# Patient Record
Sex: MALE | Employment: FULL TIME | URBAN - METROPOLITAN AREA
[De-identification: names, ages, dates, MRNs, and addresses within clinical notes are randomized per-mention and may not be internally consistent; named-entity substitution may affect disease eponyms.]

---

## 2024-06-02 ENCOUNTER — HOSPITAL ENCOUNTER (EMERGENCY)
Facility: HOSPITAL | Age: 29
Discharge: HOME | End: 2024-06-02
Attending: EMERGENCY MEDICINE
Payer: COMMERCIAL

## 2024-06-02 VITALS
HEART RATE: 77 BPM | OXYGEN SATURATION: 95 % | BODY MASS INDEX: 25.1 KG/M2 | RESPIRATION RATE: 16 BRPM | SYSTOLIC BLOOD PRESSURE: 105 MMHG | TEMPERATURE: 98.8 F | WEIGHT: 169.97 LBS | DIASTOLIC BLOOD PRESSURE: 69 MMHG

## 2024-06-02 DIAGNOSIS — F11.10 HEROIN ABUSE (HCC): Primary | ICD-10-CM

## 2024-06-02 PROCEDURE — 99283 EMERGENCY DEPT VISIT LOW MDM: CPT

## 2024-06-02 PROCEDURE — 99284 EMERGENCY DEPT VISIT MOD MDM: CPT | Performed by: EMERGENCY MEDICINE

## 2024-06-02 NOTE — ED PROVIDER NOTES
History  Chief Complaint   Patient presents with    Drug Problem     Patient says he was at [facility], has been using suboxone, but had trouble refilling it and he used Fentanyl this morning     28-year-old male who arrives via EMS for concerns of intoxication.  Patient endorses using fentanyl or heroin earlier in the day.  States he ran out of his Suboxone, relapsed that she was feeling withdrawal.  Patient has questions on how to restart at home.  Advised to delay at least 36 hours hopefully 48 hours between uses of heroin and fentanyl before restarting.  Patient verbalized understanding of plan.      Drug Problem  Associated symptoms: no abdominal pain, no chest pain, no cough, no diarrhea, no fever, no nausea, no rash, no rhinorrhea, no shortness of breath, no sore throat, no vomiting and no wheezing        Prior to Admission Medications   Prescriptions Last Dose Informant Patient Reported? Taking?   Narcan 4 MG/0.1ML nasal spray   Yes No   QUEtiapine (SEROquel) 100 mg tablet   Yes No   Sig: Take 100 mg by mouth   acetaminophen (TYLENOL) 325 mg tablet   Yes No   albuterol (PROVENTIL HFA,VENTOLIN HFA) 90 mcg/act inhaler   Yes No   Sig: Inhale 1 puff 4 (four) times a day   buprenorphine (SUBUTEX) 8 mg   Yes No   buprenorphine-naloxone (Suboxone) 8-2 mg   No No   Sig: Place 1 Film (8 mg total) under the tongue 2 (two) times a day   ibuprofen (MOTRIN) 800 mg tablet   Yes No   mirtazapine (REMERON) 15 mg tablet   Yes No      Facility-Administered Medications: None       Past Medical History:   Diagnosis Date    Asthma     Drug use        History reviewed. No pertinent surgical history.    History reviewed. No pertinent family history.  I have reviewed and agree with the history as documented.    E-Cigarette/Vaping    E-Cigarette Use Never User      E-Cigarette/Vaping Substances     Social History     Tobacco Use    Smoking status: Every Day     Current packs/day: 1.00     Types: Cigarettes    Smokeless tobacco: Never    Vaping Use    Vaping status: Never Used   Substance Use Topics    Alcohol use: Never    Drug use: Not Currently     Types: Marijuana, Heroin, Methamphetamines     Comment: K2       Review of Systems   Constitutional: Negative.  Negative for chills and fever.   HENT: Negative.  Negative for rhinorrhea, sore throat, trouble swallowing and voice change.    Eyes: Negative.  Negative for pain and visual disturbance.   Respiratory: Negative.  Negative for cough, shortness of breath and wheezing.    Cardiovascular: Negative.  Negative for chest pain and palpitations.   Gastrointestinal:  Negative for abdominal pain, diarrhea, nausea and vomiting.   Genitourinary: Negative.  Negative for dysuria and frequency.   Musculoskeletal: Negative.  Negative for neck pain and neck stiffness.   Skin: Negative.  Negative for rash.   Neurological: Negative.  Negative for dizziness, speech difficulty, weakness, light-headedness and numbness.       Physical Exam  Physical Exam  Vitals and nursing note reviewed.   Constitutional:       General: He is not in acute distress.     Appearance: He is well-developed.   HENT:      Head: Normocephalic and atraumatic.   Eyes:      Conjunctiva/sclera: Conjunctivae normal.      Pupils: Pupils are equal, round, and reactive to light.   Neck:      Trachea: No tracheal deviation.   Cardiovascular:      Rate and Rhythm: Normal rate and regular rhythm.   Pulmonary:      Effort: Pulmonary effort is normal. No respiratory distress.      Breath sounds: Normal breath sounds. No wheezing or rales.   Abdominal:      General: Bowel sounds are normal. There is no distension.      Palpations: Abdomen is soft.      Tenderness: There is no abdominal tenderness. There is no guarding or rebound.   Musculoskeletal:         General: No tenderness or deformity. Normal range of motion.      Cervical back: Normal range of motion and neck supple.   Skin:     General: Skin is warm and dry.      Capillary Refill: Capillary  refill takes less than 2 seconds.      Findings: No rash.   Neurological:      Mental Status: He is alert and oriented to person, place, and time.   Psychiatric:         Behavior: Behavior normal.         Vital Signs  ED Triage Vitals [06/02/24 0942]   Temperature Pulse Respirations Blood Pressure SpO2   98.8 °F (37.1 °C) 77 16 105/69 95 %      Temp Source Heart Rate Source Patient Position - Orthostatic VS BP Location FiO2 (%)   Tympanic Monitor Sitting Left arm --      Pain Score       --           Vitals:    06/02/24 0942   BP: 105/69   Pulse: 77   Patient Position - Orthostatic VS: Sitting         Visual Acuity      ED Medications  Medications - No data to display    Diagnostic Studies  Results Reviewed       None                   No orders to display              Procedures  Procedures         ED Course  ED Course as of 06/02/24 1008   Sun Jun 02, 2024   1006 Patient ambulating around the ED, requesting discharge.  Endorsed using heroin earlier in the day.  Denies alcohol use.  Had lengthy conversation regarding how to restart Suboxone at home.  Patient was offered post referral as well as admission to detox unit for Suboxone initiation.  Patient declined.  Ambulated from the ED without difficulty.                               SBIRT 22yo+      Flowsheet Row Most Recent Value   Initial Alcohol Screen: US AUDIT-C     1. How often do you have a drink containing alcohol? 0 Filed at: 06/02/2024 0946   2. How many drinks containing alcohol do you have on a typical day you are drinking?  0 Filed at: 06/02/2024 0946   3a. Male UNDER 65: How often do you have five or more drinks on one occasion? 0 Filed at: 06/02/2024 0946   Audit-C Score 0 Filed at: 06/02/2024 0946   ENRIQUE: How many times in the past year have you...    Used an illegal drug or used a prescription medication for non-medical reasons? --  [pt states he could not get his suboxone so he used fentanyl this morning.] Filed at: 06/02/2024 0946                       Medical Decision Making     Reviewed past medical records: No recent hospitalizations noted     History Provided by: Patient, EMS     Differential considered: Recreational drug use, hypoglycemia     Consideration of tests: Patient has no history of diabetes.  Endorsing use of recreational drugs.  He was agreeable to being monitored in the ER for short period of time.  He was then able to get up and ambulate around the ER without difficulty as noted in ED course.  Offered admission he declined.  Follow-up and return precautions reviewed.       I have reviewed the patient's visit and any testing done in the emergency department.  They have verbalized their understanding of any testing done today and have no further questions or concerns regarding their care in the emergency room.  They will follow up with their primary care physician as well as with any specialist in their discharge instructions.  Strict return precautions were discussed.             Disposition  Final diagnoses:   Heroin abuse (HCC)     Time reflects when diagnosis was documented in both MDM as applicable and the Disposition within this note       Time User Action Codes Description Comment    6/2/2024 10:08 AM Anupam Perez [F11.10] Heroin abuse (HCC)           ED Disposition       ED Disposition   Discharge    Condition   --    Date/Time   Sun Jun 2, 2024 1007    Comment   Deonte Das discharge to home/self care.                   Follow-up Information       Follow up With Specialties Details Why Contact Info Additional Information    [facility] In 1 week  [address] [place] [id number] [phone] [facility], [address] [place] [address], [address] [phone]            Discharge Medication List as of 6/2/2024 10:08 AM        CONTINUE these medications which have NOT CHANGED     Details   acetaminophen (TYLENOL) 325 mg tablet Historical Med      albuterol (PROVENTIL HFA,VENTOLIN HFA) 90 mcg/act inhaler Inhale 1 puff 4 (four) times a day, Starting Wed 3/13/2024, Historical Med      buprenorphine (SUBUTEX) 8 mg Historical Med      buprenorphine-naloxone (Suboxone) 8-2 mg Place 1 Film (8 mg total) under the tongue 2 (two) times a day, Starting Fri 5/3/2024, Until Sun 6/2/2024, Normal      ibuprofen (MOTRIN) 800 mg tablet Historical Med      mirtazapine (REMERON) 15 mg tablet Historical Med      Narcan 4 MG/0.1ML nasal spray Historical Med      QUEtiapine (SEROquel) 100 mg tablet Take 100 mg by mouth, Historical Med             No discharge procedures on file.    PDMP Review       None            ED Provider  Electronically Signed by             Anupam Perez DO  06/02/24 5018

## 2024-06-05 ENCOUNTER — HOSPITAL ENCOUNTER (EMERGENCY)
Facility: HOSPITAL | Age: 29
Discharge: HOME | End: 2024-06-05
Attending: EMERGENCY MEDICINE
Payer: COMMERCIAL

## 2024-06-05 VITALS
TEMPERATURE: 98.3 F | OXYGEN SATURATION: 92 % | SYSTOLIC BLOOD PRESSURE: 113 MMHG | DIASTOLIC BLOOD PRESSURE: 59 MMHG | HEART RATE: 68 BPM | RESPIRATION RATE: 18 BRPM

## 2024-06-05 DIAGNOSIS — F19.10 SUBSTANCE ABUSE (HCC): Primary | ICD-10-CM

## 2024-06-05 PROCEDURE — 99283 EMERGENCY DEPT VISIT LOW MDM: CPT

## 2024-06-05 PROCEDURE — 99282 EMERGENCY DEPT VISIT SF MDM: CPT | Performed by: EMERGENCY MEDICINE

## 2024-06-05 NOTE — ED PROVIDER NOTES
History  No chief complaint on file.    Patient is a 28-year-old male found slumped over by [employer].  EMS called.  Patient admitted to smoking K2 earlier today.  No other substance use, no etoh.  No medical complaints.  No Si with use.  Upset that he was brought in.          Prior to Admission Medications   Prescriptions Last Dose Informant Patient Reported? Taking?   Narcan 4 MG/0.1ML nasal spray   Yes No   QUEtiapine (SEROquel) 100 mg tablet   Yes No   Sig: Take 100 mg by mouth   acetaminophen (TYLENOL) 325 mg tablet   Yes No   albuterol (PROVENTIL HFA,VENTOLIN HFA) 90 mcg/act inhaler   Yes No   Sig: Inhale 1 puff 4 (four) times a day   buprenorphine (SUBUTEX) 8 mg   Yes No   ibuprofen (MOTRIN) 800 mg tablet   Yes No   mirtazapine (REMERON) 15 mg tablet   Yes No      Facility-Administered Medications: None       Past Medical History:   Diagnosis Date   • Asthma    • Drug use        No past surgical history on file.    No family history on file.  I have reviewed and agree with the history as documented.    E-Cigarette/Vaping   • E-Cigarette Use Never User      E-Cigarette/Vaping Substances     Social History     Tobacco Use   • Smoking status: Every Day     Current packs/day: 1.00     Types: Cigarettes   • Smokeless tobacco: Never   Vaping Use   • Vaping status: Never Used   Substance Use Topics   • Alcohol use: Never   • Drug use: Not Currently     Types: Marijuana, Heroin, Methamphetamines     Comment: K2       Review of Systems   Constitutional: Negative.    HENT: Negative.     Eyes: Negative.    Respiratory: Negative.     Cardiovascular: Negative.    Gastrointestinal: Negative.    Endocrine: Negative.    Genitourinary: Negative.    Musculoskeletal: Negative.    Skin: Negative.    Allergic/Immunologic: Negative.    Neurological: Negative.    Hematological: Negative.    Psychiatric/Behavioral: Negative.     All other systems reviewed and are negative.      Physical Exam  Physical Exam  Vitals and nursing note  reviewed.   Constitutional:       Appearance: Normal appearance. He is normal weight.   HENT:      Head: Normocephalic and atraumatic.      Nose: Nose normal.      Mouth/Throat:      Mouth: Mucous membranes are moist.   Cardiovascular:      Rate and Rhythm: Normal rate and regular rhythm.      Pulses: Normal pulses.      Heart sounds: Normal heart sounds.   Pulmonary:      Effort: Pulmonary effort is normal.      Breath sounds: Normal breath sounds.   Abdominal:      General: Bowel sounds are normal.      Palpations: Abdomen is soft.   Musculoskeletal:         General: Normal range of motion.      Cervical back: Normal range of motion and neck supple.   Skin:     General: Skin is warm and dry.      Capillary Refill: Capillary refill takes less than 2 seconds.   Neurological:      General: No focal deficit present.      Mental Status: He is alert and oriented to person, place, and time.         Vital Signs  ED Triage Vitals   Temp Pulse Resp BP SpO2   -- -- -- -- --      Temp src Heart Rate Source Patient Position - Orthostatic VS BP Location FiO2 (%)   -- -- -- -- --      Pain Score       --           There were no vitals filed for this visit.      Visual Acuity      ED Medications  Medications - No data to display    Diagnostic Studies  Results Reviewed       None                   No orders to display              Procedures  Procedures         ED Course  ED Course as of 06/05/24 2001 Wed Jun 05, 2024   1713 Patient walked out of department.                                             Medical Decision Making           Disposition  Final diagnoses:   None     ED Disposition       None          Follow-up Information    None         Patient's Medications   Discharge Prescriptions    No medications on file       No discharge procedures on file.    PDMP Review       None            ED Provider  Electronically Signed by             Heron Duenas MD  06/05/24 2001

## 2024-11-30 ENCOUNTER — HOSPITAL ENCOUNTER (EMERGENCY)
Facility: HOSPITAL | Age: 29
Discharge: HOME | End: 2024-11-30
Attending: EMERGENCY MEDICINE | Admitting: EMERGENCY MEDICINE
Payer: COMMERCIAL

## 2024-11-30 VITALS
DIASTOLIC BLOOD PRESSURE: 65 MMHG | OXYGEN SATURATION: 96 % | RESPIRATION RATE: 20 BRPM | HEART RATE: 92 BPM | SYSTOLIC BLOOD PRESSURE: 120 MMHG | TEMPERATURE: 97.5 F

## 2024-11-30 DIAGNOSIS — F19.10 SUBSTANCE ABUSE (HCC): Primary | ICD-10-CM

## 2024-11-30 PROCEDURE — 99283 EMERGENCY DEPT VISIT LOW MDM: CPT

## 2024-11-30 NOTE — ED PROVIDER NOTES
Time reflects when diagnosis was documented in both MDM as applicable and the Disposition within this note       Time User Action Codes Description Comment    11/30/2024  2:13 PM Richi Ortiz Add [F19.10] Substance abuse (HCC)           ED Disposition       ED Disposition   Discharge    Condition   Stable    Date/Time   Sat Nov 30, 2024  2:13 PM    Comment   Deonte Bartlett discharge to home/self care.                   Assessment & Plan       Medical Decision Making  Patient is a 29-year-old male who came in for a large substance abuse.  Is in no acute distress this time.  Did not receive any Narcan prior to arrival.  Patient discharged in no acute distress, able to ambulate out of the Emergency Department without need of assistance             Medications - No data to display    ED Risk Strat Scores                                               History of Present Illness       Chief Complaint   Patient presents with    Overdose - Accidental     Arrives with EMS, admits to K2 use.        Past Medical History:   Diagnosis Date    Asthma     Drug use       History reviewed. No pertinent surgical history.   History reviewed. No pertinent family history.   Social History     Tobacco Use    Smoking status: Every Day     Current packs/day: 1.00     Types: Cigarettes    Smokeless tobacco: Never   Vaping Use    Vaping status: Never Used   Substance Use Topics    Alcohol use: Never    Drug use: Not Currently     Types: Marijuana, Heroin, Methamphetamines     Comment: K2      E-Cigarette/Vaping    E-Cigarette Use Never User       E-Cigarette/Vaping Substances      I have reviewed and agree with the history as documented.     Patient is a 29-year-old male coming in by way of ambulance, after being found huddled up outside in the cold.  Patient was found by a lighter, and reportedly smelled of K2 use.  Patient denies substance abuse at this time, is alert and oriented, and states that he is merely tired, and wants  to go home.  Patient is able to walk in a straight line without any sign of intoxication, or need of assistance.  Patient has no other complaints at this time      Overdose - Accidental      Review of Systems        Objective       ED Triage Vitals [11/30/24 1412]   Temperature Pulse Blood Pressure Respirations SpO2 Patient Position - Orthostatic VS   97.5 °F (36.4 °C) 92 120/65 20 96 % Lying      Temp Source Heart Rate Source BP Location FiO2 (%) Pain Score    Oral Monitor Left arm -- --      Vitals      Date and Time Temp Pulse SpO2 Resp BP Pain Score FACES Pain Rating User   11/30/24 1412 97.5 °F (36.4 °C) 92 96 % 20 120/65 -- -- ES            Physical Exam  Vitals reviewed.   Constitutional:       Appearance: Normal appearance. He is normal weight.   HENT:      Head: Normocephalic and atraumatic.      Right Ear: External ear normal.      Left Ear: External ear normal.      Nose: Nose normal.   Eyes:      Conjunctiva/sclera: Conjunctivae normal.   Cardiovascular:      Rate and Rhythm: Normal rate.   Pulmonary:      Effort: Pulmonary effort is normal.   Musculoskeletal:         General: Normal range of motion.      Cervical back: Normal range of motion.   Skin:     General: Skin is warm and dry.   Neurological:      Mental Status: He is alert.         Results Reviewed       None            No orders to display       Procedures    ED Medication and Procedure Management   Prior to Admission Medications   Prescriptions Last Dose Informant Patient Reported? Taking?   Narcan 4 MG/0.1ML nasal spray   Yes No   QUEtiapine (SEROquel) 100 mg tablet   Yes No   Sig: Take 100 mg by mouth   acetaminophen (TYLENOL) 325 mg tablet   Yes No   albuterol (PROVENTIL HFA,VENTOLIN HFA) 90 mcg/act inhaler   Yes No   Sig: Inhale 1 puff 4 (four) times a day   buprenorphine (SUBUTEX) 8 mg   Yes No   ibuprofen (MOTRIN) 800 mg tablet   Yes No   mirtazapine (REMERON) 15 mg tablet   Yes No      Facility-Administered Medications: None      Discharge Medication List as of 11/30/2024  2:21 PM        CONTINUE these medications which have NOT CHANGED    Details   acetaminophen (TYLENOL) 325 mg tablet Historical Med      albuterol (PROVENTIL HFA,VENTOLIN HFA) 90 mcg/act inhaler Inhale 1 puff 4 (four) times a day, Starting Wed 3/13/2024, Historical Med      buprenorphine (SUBUTEX) 8 mg Historical Med      ibuprofen (MOTRIN) 800 mg tablet Historical Med      mirtazapine (REMERON) 15 mg tablet Historical Med      Narcan 4 MG/0.1ML nasal spray Historical Med      QUEtiapine (SEROquel) 100 mg tablet Take 100 mg by mouth, Historical Med           No discharge procedures on file.  ED SEPSIS DOCUMENTATION   Time reflects when diagnosis was documented in both MDM as applicable and the Disposition within this note       Time User Action Codes Description Comment    11/30/2024  2:13 PM Richi Ortiz Add [F19.10] Substance abuse (HCC)                  Richi Ortiz PA-C  11/30/24 1545

## 2024-12-01 ENCOUNTER — HOSPITAL ENCOUNTER (EMERGENCY)
Facility: HOSPITAL | Age: 29
Discharge: LEFT BEFORE DISCHARGE | End: 2024-12-01
Attending: EMERGENCY MEDICINE | Admitting: EMERGENCY MEDICINE
Payer: COMMERCIAL

## 2024-12-01 DIAGNOSIS — F19.10 SUBSTANCE ABUSE (HCC): Primary | ICD-10-CM

## 2024-12-01 PROCEDURE — 99283 EMERGENCY DEPT VISIT LOW MDM: CPT

## 2024-12-01 NOTE — ED NOTES
"This tech attempted to do vitals. Patient refused. This tech explained importance of checking his vitals. Patient refused, \"No, I'm leaving.\" Patient eloped out of ED.      Michaela Weslye  12/01/24 2646    "

## 2024-12-01 NOTE — ED PROVIDER NOTES
Time reflects when diagnosis was documented in both MDM as applicable and the Disposition within this note       Time User Action Codes Description Comment    12/1/2024  3:58 PM Richi Ortiz Add [F19.10] Substance abuse (HCC)           ED Disposition       ED Disposition   Discharge    Condition   Stable    Date/Time   Sun Dec 1, 2024  3:58 PM    Comment   Deonte Bartlett discharge to home/self care.                   Assessment & Plan       Medical Decision Making  Patient is a 29-year-old male well-known to this emergency primary, coming in after substance abuse.  Patient was found by EMS, altered on the side of the road.  Did not receive any Narcan prior to arrival.  Patient at this time is alert oriented, states he does not want to stay.  Patient has declined vitals, and I watched patient walk out of the emergency department in no acute distress, without any need of assistance.  Patient declines further assessment, has no acute complaints.             Medications - No data to display    ED Risk Strat Scores                                               History of Present Illness       No chief complaint on file.      Past Medical History:   Diagnosis Date    Asthma     Drug use       No past surgical history on file.   No family history on file.   Social History     Tobacco Use    Smoking status: Every Day     Current packs/day: 1.00     Types: Cigarettes    Smokeless tobacco: Never   Vaping Use    Vaping status: Never Used   Substance Use Topics    Alcohol use: Never    Drug use: Not Currently     Types: Marijuana, Heroin, Methamphetamines     Comment: K2      E-Cigarette/Vaping    E-Cigarette Use Never User       E-Cigarette/Vaping Substances      I have reviewed and agree with the history as documented.     HPI    Review of Systems        Objective       ED Triage Vitals   Temp Pulse BP Resp SpO2 Patient Position - Orthostatic VS   -- -- -- -- -- --      Temp src Heart Rate Source BP Location FiO2  (%) Pain Score    -- -- -- -- --      Vitals    None         Physical Exam    Results Reviewed       None            No orders to display       Procedures    ED Medication and Procedure Management   Prior to Admission Medications   Prescriptions Last Dose Informant Patient Reported? Taking?   Narcan 4 MG/0.1ML nasal spray   Yes No   QUEtiapine (SEROquel) 100 mg tablet   Yes No   Sig: Take 100 mg by mouth   acetaminophen (TYLENOL) 325 mg tablet   Yes No   albuterol (PROVENTIL HFA,VENTOLIN HFA) 90 mcg/act inhaler   Yes No   Sig: Inhale 1 puff 4 (four) times a day   buprenorphine (SUBUTEX) 8 mg   Yes No   ibuprofen (MOTRIN) 800 mg tablet   Yes No   mirtazapine (REMERON) 15 mg tablet   Yes No      Facility-Administered Medications: None     Patient's Medications   Discharge Prescriptions    No medications on file     No discharge procedures on file.  ED SEPSIS DOCUMENTATION   Time reflects when diagnosis was documented in both MDM as applicable and the Disposition within this note       Time User Action Codes Description Comment    12/1/2024  3:58 PM Richi Ortiz Add [F19.10] Substance abuse (HCC)                  Richi Ortiz PA-C  12/01/24 1558

## 2025-05-16 ENCOUNTER — APPOINTMENT (EMERGENCY)
Dept: RADIOLOGY | Facility: HOSPITAL | Age: 30
End: 2025-05-16
Payer: COMMERCIAL

## 2025-05-16 ENCOUNTER — APPOINTMENT (EMERGENCY)
Dept: CT IMAGING | Facility: HOSPITAL | Age: 30
End: 2025-05-16
Payer: COMMERCIAL

## 2025-05-16 ENCOUNTER — HOSPITAL ENCOUNTER (EMERGENCY)
Facility: HOSPITAL | Age: 30
Discharge: HOME | End: 2025-05-16
Attending: INTERNAL MEDICINE
Payer: COMMERCIAL

## 2025-05-16 VITALS
SYSTOLIC BLOOD PRESSURE: 147 MMHG | TEMPERATURE: 98.6 F | WEIGHT: 173.72 LBS | HEART RATE: 88 BPM | OXYGEN SATURATION: 98 % | BODY MASS INDEX: 25.65 KG/M2 | RESPIRATION RATE: 18 BRPM | DIASTOLIC BLOOD PRESSURE: 89 MMHG

## 2025-05-16 DIAGNOSIS — F19.90 RECREATIONAL DRUG USE: ICD-10-CM

## 2025-05-16 DIAGNOSIS — Z53.29 LEFT AGAINST MEDICAL ADVICE: ICD-10-CM

## 2025-05-16 DIAGNOSIS — S00.93XA CONTUSION OF HEAD: ICD-10-CM

## 2025-05-16 DIAGNOSIS — R79.89 ELEVATED TROPONIN: ICD-10-CM

## 2025-05-16 DIAGNOSIS — T40.601A OVERDOSE OPIATE, ACCIDENTAL OR UNINTENTIONAL, INITIAL ENCOUNTER (HCC): Primary | ICD-10-CM

## 2025-05-16 DIAGNOSIS — T14.8XXA ABRASION: ICD-10-CM

## 2025-05-16 LAB
2HR DELTA HS TROPONIN: 6 NG/L
ALBUMIN SERPL BCG-MCNC: 4.6 G/DL (ref 3.5–5)
ALP SERPL-CCNC: 71 U/L (ref 34–104)
ALT SERPL W P-5'-P-CCNC: 10 U/L (ref 7–52)
AMPHETAMINES SERPL QL SCN: NEGATIVE
ANION GAP SERPL CALCULATED.3IONS-SCNC: 9 MMOL/L (ref 4–13)
APAP SERPL-MCNC: <2 UG/ML (ref 10–20)
AST SERPL W P-5'-P-CCNC: 15 U/L (ref 13–39)
ATRIAL RATE: 64 BPM
BARBITURATES UR QL: NEGATIVE
BASOPHILS # BLD AUTO: 0.04 THOUSANDS/ÂΜL (ref 0–0.1)
BASOPHILS NFR BLD AUTO: 0 % (ref 0–1)
BENZODIAZ UR QL: NEGATIVE
BILIRUB SERPL-MCNC: 0.97 MG/DL (ref 0.2–1)
BUN SERPL-MCNC: 24 MG/DL (ref 5–25)
CALCIUM SERPL-MCNC: 9.1 MG/DL (ref 8.4–10.2)
CARDIAC TROPONIN I PNL SERPL HS: 3 NG/L (ref ?–50)
CARDIAC TROPONIN I PNL SERPL HS: 9 NG/L (ref ?–50)
CHLORIDE SERPL-SCNC: 102 MMOL/L (ref 96–108)
CO2 SERPL-SCNC: 28 MMOL/L (ref 21–32)
COCAINE UR QL: NEGATIVE
CREAT SERPL-MCNC: 1.11 MG/DL (ref 0.6–1.3)
EOSINOPHIL # BLD AUTO: 0.07 THOUSAND/ÂΜL (ref 0–0.61)
EOSINOPHIL NFR BLD AUTO: 1 % (ref 0–6)
ERYTHROCYTE [DISTWIDTH] IN BLOOD BY AUTOMATED COUNT: 12.9 % (ref 11.6–15.1)
ETHANOL SERPL-MCNC: <10 MG/DL
FENTANYL UR QL SCN: POSITIVE
GFR SERPL CREATININE-BSD FRML MDRD: 88 ML/MIN/1.73SQ M
GLUCOSE SERPL-MCNC: 164 MG/DL (ref 65–140)
HCT VFR BLD AUTO: 47.9 % (ref 36.5–49.3)
HGB BLD-MCNC: 15.1 G/DL (ref 12–17)
HYDROCODONE UR QL SCN: NEGATIVE
IMM GRANULOCYTES # BLD AUTO: 0.04 THOUSAND/UL (ref 0–0.2)
IMM GRANULOCYTES NFR BLD AUTO: 0 % (ref 0–2)
LYMPHOCYTES # BLD AUTO: 2.73 THOUSANDS/ÂΜL (ref 0.6–4.47)
LYMPHOCYTES NFR BLD AUTO: 30 % (ref 14–44)
MCH RBC QN AUTO: 29 PG (ref 26.8–34.3)
MCHC RBC AUTO-ENTMCNC: 31.5 G/DL (ref 31.4–37.4)
MCV RBC AUTO: 92 FL (ref 82–98)
METHADONE UR QL: NEGATIVE
MONOCYTES # BLD AUTO: 0.58 THOUSAND/ÂΜL (ref 0.17–1.22)
MONOCYTES NFR BLD AUTO: 6 % (ref 4–12)
NEUTROPHILS # BLD AUTO: 5.6 THOUSANDS/ÂΜL (ref 1.85–7.62)
NEUTS SEG NFR BLD AUTO: 63 % (ref 43–75)
NRBC BLD AUTO-RTO: 0 /100 WBCS
OPIATES UR QL SCN: POSITIVE
OXYCODONE+OXYMORPHONE UR QL SCN: NEGATIVE
P AXIS: 59 DEGREES
PCP UR QL: NEGATIVE
PLATELET # BLD AUTO: 179 THOUSANDS/UL (ref 149–390)
PMV BLD AUTO: 10.6 FL (ref 8.9–12.7)
POTASSIUM SERPL-SCNC: 3.3 MMOL/L (ref 3.5–5.3)
PR INTERVAL: 146 MS
PROT SERPL-MCNC: 7 G/DL (ref 6.4–8.4)
QRS AXIS: 55 DEGREES
QRSD INTERVAL: 96 MS
QT INTERVAL: 374 MS
QTC INTERVAL: 386 MS
RBC # BLD AUTO: 5.2 MILLION/UL (ref 3.88–5.62)
SALICYLATES SERPL-MCNC: <5 MG/DL (ref 5–20)
SODIUM SERPL-SCNC: 139 MMOL/L (ref 135–147)
T WAVE AXIS: 36 DEGREES
THC UR QL: POSITIVE
VENTRICULAR RATE: 64 BPM
WBC # BLD AUTO: 9.06 THOUSAND/UL (ref 4.31–10.16)

## 2025-05-16 PROCEDURE — 93005 ELECTROCARDIOGRAM TRACING: CPT

## 2025-05-16 PROCEDURE — 82077 ASSAY SPEC XCP UR&BREATH IA: CPT

## 2025-05-16 PROCEDURE — 96361 HYDRATE IV INFUSION ADD-ON: CPT

## 2025-05-16 PROCEDURE — 80053 COMPREHEN METABOLIC PANEL: CPT

## 2025-05-16 PROCEDURE — 80143 DRUG ASSAY ACETAMINOPHEN: CPT

## 2025-05-16 PROCEDURE — 80179 DRUG ASSAY SALICYLATE: CPT

## 2025-05-16 PROCEDURE — 70450 CT HEAD/BRAIN W/O DYE: CPT

## 2025-05-16 PROCEDURE — 96360 HYDRATION IV INFUSION INIT: CPT

## 2025-05-16 PROCEDURE — 99285 EMERGENCY DEPT VISIT HI MDM: CPT

## 2025-05-16 PROCEDURE — 74174 CTA ABD&PLVS W/CONTRAST: CPT

## 2025-05-16 PROCEDURE — 93010 ELECTROCARDIOGRAM REPORT: CPT | Performed by: INTERNAL MEDICINE

## 2025-05-16 PROCEDURE — 71045 X-RAY EXAM CHEST 1 VIEW: CPT

## 2025-05-16 PROCEDURE — 72125 CT NECK SPINE W/O DYE: CPT

## 2025-05-16 PROCEDURE — 71275 CT ANGIOGRAPHY CHEST: CPT

## 2025-05-16 PROCEDURE — 85025 COMPLETE CBC W/AUTO DIFF WBC: CPT

## 2025-05-16 PROCEDURE — 84484 ASSAY OF TROPONIN QUANT: CPT

## 2025-05-16 PROCEDURE — 80307 DRUG TEST PRSMV CHEM ANLYZR: CPT

## 2025-05-16 PROCEDURE — 36415 COLL VENOUS BLD VENIPUNCTURE: CPT

## 2025-05-16 RX ORDER — NALOXONE HYDROCHLORIDE 0.4 MG/ML
0.4 INJECTION, SOLUTION INTRAMUSCULAR; INTRAVENOUS; SUBCUTANEOUS ONCE AS NEEDED
Status: DISCONTINUED | OUTPATIENT
Start: 2025-05-16 | End: 2025-05-16 | Stop reason: HOSPADM

## 2025-05-16 RX ORDER — ONDANSETRON 2 MG/ML
4 INJECTION INTRAMUSCULAR; INTRAVENOUS ONCE AS NEEDED
Status: DISCONTINUED | OUTPATIENT
Start: 2025-05-16 | End: 2025-05-16 | Stop reason: HOSPADM

## 2025-05-16 RX ORDER — NALOXONE HYDROCHLORIDE 1 MG/ML
INJECTION INTRAMUSCULAR; INTRAVENOUS; SUBCUTANEOUS
Status: COMPLETED
Start: 2025-05-16 | End: 2025-05-16

## 2025-05-16 RX ORDER — POTASSIUM CHLORIDE 1500 MG/1
20 TABLET, EXTENDED RELEASE ORAL ONCE
Status: COMPLETED | OUTPATIENT
Start: 2025-05-16 | End: 2025-05-16

## 2025-05-16 RX ADMIN — IOHEXOL 90 ML: 350 INJECTION, SOLUTION INTRAVENOUS at 14:51

## 2025-05-16 RX ADMIN — NALOXONE HYDROCHLORIDE 4 MG: 4 SPRAY NASAL at 16:51

## 2025-05-16 RX ADMIN — POTASSIUM CHLORIDE 20 MEQ: 1500 TABLET, EXTENDED RELEASE ORAL at 16:14

## 2025-05-16 RX ADMIN — SODIUM CHLORIDE 1000 ML: 0.9 INJECTION, SOLUTION INTRAVENOUS at 13:59

## 2025-05-16 NOTE — CERTIFIED RECOVERY SPECIALIST
Certified  Note  Emergency Medicine   Name: Deonte Bartlett 29 y.o. male I MRN: [id number]  Unit/Bed#: ED 03 I Date of Admission: 5/16/2025   Date of Service: 5/16/2025 I Hospital Day: 0    Summary of Contact   CRS currently at other campus, Reached out to provider and explained. Resources provided     Follow up: NA      '   Administrative Statements  I have spent a total time of 0 minutes in caring for this patient on the day of the visit/encounter.    Karin Arteaga

## 2025-05-16 NOTE — ED CARE HANDOFF
[facility] Warm Handoff Outcome Note    Patient name Deonte Bartlett  Location ED 03/ED 03 MRN [id number]  Age: 29 y.o.          Plan Type:  Warm Handoff                                                                                    Plan Date: 5/16/2025  Service:  ED Warm Handoff      Substance Use History:      Warm Handoff Update: Pt does need IP, just bought some bad weed    Warm Handoff Outcome: Patient Refused

## 2025-05-16 NOTE — ED NOTES
Pt states he isnt looking for rehab - states he made a mistake and bought bad weed     Leola Segovia, LARA  05/16/25 7725

## 2025-05-16 NOTE — DISCHARGE INSTR - OTHER ORDERS
Karin Arteaga   Certified     [facility] [place] and [facility]  [phone]  Monday-Friday 6:45am-3:15pm      CRISIS INFORMATION  If you are experiencing a mental health emergency, you may call the [facility] Crisis Intervention Office 24 hours a day, 7 days per week at [phone].    In [facility], call [phone].    Warmline is a confidential 24/7 telephone support service manned by trained mental health consumers.  Warmline provides support, a listening ear and can provide information about available services.[facility]armline specializes in the concerns of mental health consumers, their families and friends.  However, we are also here for anyone who has a mental health concern, is confused about or just doesn't know anything about mental health or where to get information.  To reach [facility], call [phone].    HOW TO GET SUBSTANCE ABUSE HELP:  If you or someone you know has a drug or alcohol problem, there is help:  [facility] Drug & Alcohol Abuse Services: [phone]  [facility] Abuse Services: [phone]  An assessment is the first step.   In addition to those listed there are other programs available in the area but assessment is best to determine an appropriate level of care.  If you DO NOT have Medical Assistance (MA) or Private Insurance, an assessment can be scheduled at one of these providers:  [facility]  [address] [place] [address]  [phone]   [facility]  [address] [facility] [place] [address]  [phone]   [facility]  [address] [place] [address]  [phone]   [facility]  [address] [facility] [address] [place] [address]  [phone]   [facility]  [address] [facility] [place] [address]  [phone]   [facility]  [address] [facility] [place] [address]  [phone]   [facility]   [address], [place] [address]  [phone]     If you HAVE Medical Assistance, an assessment can be scheduled at one of these providers:  [place] on Alcohol & Drug Abuse  [address] [facility] [place] [address]  [phone]   [facility]  [address] [place] [address]  [phone]   [facility]  [address], 1st Floor, [place] [address]  [phone]  [address] [place] [address], [address] [place] [address] [phone]   [facility]  [address] [place] [address]  [phone]   [facility]  [address] [facility] [place] [address]  [phone]   [facility] ([facility])  [address] [facility] [place] [address]  [phone]   [facility]  [address] [facility] [address] [place] [address]  [phone]   [facility]  [address] [facility] [place] [address]  [phone]   [facility]  [address] [facility] [address]  [phone]   [facility]  [address] [facility], [place] [address]  [phone]     If you HAVE Private Insurance, an assessment can be scheduled at one of these providers.  Please contact these Providers to determine if they are in your network plan:  [facility]  [address], 1st Floor, [place] [address]  [phone]   [address] [facility] [place] [address]  [phone]   [facility]  [address] [place] [address]  [phone]   [facility])  [address] [facility] [place] [address]  [phone]   [facility]  [address] [facility] [address] [place] [address]  [phone]   [employer]  [address] [facility], [place] [address]  [phone]     From the [facility] website www.[place].gov/guides/opioid-epidemic/#GetNaloxone    How do I get naloxone?  Family members and friends can access naloxone  by:    Obtaining a prescription from their family doctor  Using the standing order issued by Acting Physician General Bhakti Salazar. A standing order is a prescription written for the general public, rather than specifically for an individual.  To use the standing order, print it and take it with you to the pharmacy or have the digital version on your phone. Download the standing order from the Department of [facility] (PDF).    If you are unable to print it or use the digital version, the standing order is kept on file at many pharmacies. If a pharmacy does not have it on file, they may have the ability to look it up.    Naloxone prescriptions can be filled at most pharmacies. Although the medication might not be available for same-day pickup, it often can be ordered and available within a day or two.    Local Food Bank Locations & Contact Information:  [facility] Food Bird  Municipalities:  [place], [place], [place], [place], [place], [place], [place], [place],  [place], and [place]  Emergency Food Pantries  [address]  Address: [address] [place] [address]  Phone: [phone]  Hours of Operation: Fridays 10:00AM-1:00PM  [facility]  Address: [address] [address]  Phone: [phone]  Hours of Operation: Thursdays 5:30PM-6:30PM    [place] [id number]  [facility]cha [facility]  Address: [address] [place] [address]  Phone: [phone]  Hours of Operation: Monday-Friday 9:30AM-12:00PM  [facility]  Address: [address] [address]  Phone: [phone]  Hours of Operation: By appointment -- Mondays, Tuesdays, Thursdays, & Fridays 8:30AM-4:00PM;  Wednesdays 8:30AM-12:00PM  Jeanmarie Zapata  Address: [address] [address]  Phone: [phone]  Hours of Operation: 1st & 3rd Saturdays 10:00AM-12:00PM  [facility]  Address: [address]  [place] [address]  Phone: [phone]  Hours of Operation: 2nd & 4th Thursdays 4:00PM-5:30PM (Only 4th Thursdays during the summer)  Zoroastrian Bahai French Jew  Address: [address] [place] [address]  Phone: [phone]  Hours of Operation: By appointment -- Wednesdays 6:00PM  [facility]  Address: [address] [place] [address]  Phone: [phone]  Hours of Operation: Thursdays 11:00AM-2:00PM or By appointment  [facility]  Address: [address] [address]  Phone: [phone]  Hours of Operation: Saturdays 9:00AM-11:30AM  Henny Restorationism Jew  Address: [address] [address]  Phone: [phone]  Hours of Operation: Fridays, 3rd & 4th Saturdays 9:00AM-11:00AM  Yeyo Reynao Pentecostales  Address: [address] [place] [address]  Phone: [phone]  Hours of Operation: Tuesdays 3:00PM-5:00PM or By appointment  [facility]  Address: [address] [place] [address]  Phone: [phone]  Hours of Operation: By appointment  [facility]urrection [employer]  Address: [address] [place] [address]  Phone: [phone]  Hours of Operation: 3rd Saturday, 8:00AM-11:00AM  RIPPLE  Address: [address] [place] [address]  Phone: [phone]  Hours of Operation: 3rd Sunday 4:00PM-5:30PM    Spanish [facility] [employer] ([employer])  Address: [address] [address]  Phone: [phone]  Hours of Operation: 3rd Wednesday 9:00AM-4:00PM  [place] United Sikhism Jew  Address: [address] [place] [address]  Phone: [phone]  Hours of Operation: 1st & 3rd Saturdays 9:00AM-11:30AM  [facility]  Address: [address], [place] [address]  Phone: [phone]  Hours of Operation: By appointment  [place] - [address]  [place] [facility] [employer] [facility]  Address: [address], [place] [address]  Phone: [phone]  Hours of  Operation: 3rd Sunday 12:00PM-1:30PM  [facility]  Address: [address] [facility] [place] [address]  Phone: [phone]  Hours of Operation: 1st & 3rd Thursdays 6:30PM-7:30PM; By appointment -- Mondays  [facility]  Address: [address] [place] [address]  Phone: [phone]  Hours of Operation: By appointment  [facility]  Address: [address] [place] [address]  Phone: [phone]  Hours of Operation: 1st & 3rd Wednesdays 4:00PM-5:30PM  [place] [facility]  Address: [address] [address]  Phone: [phone]  Hours of Operation: 4th Wednesday 6:30PM-7:30PM  [facility]  Address: [address] [place] [address]  Phone: [phone]  Hours of Operation: 2nd Tuesday 10:00AM-12:00PM; 4th Thursday 4:00PM-6:00PM  [place]  [address]  [facility] (Kosher [facility] Non-Kosher)  Address: [address] [facility], [address]  Phone: [phone]  Hours of Operation: By appointment -- Monday-Friday 9:00AM-5:00PM  [place] [address]  [facility]  Address: [address] [facility] [place] [address]  Phone: [phone]  Hours of Operation: 1st & 3rd Tuesdays 9:00AM-10:30AM; Thursdays 6:00PM-8:00PM    [facility]  Address: [address] [place] [address]  Phone: [phone]  Hours of Operation: Every other Saturday 10:00AM-12:00PM  [facility]  Address: [address] [place] [address]  Phone: [phone]  Hours of Operation: 3rd Monday 6:00PM-7:30PM  [place] [address]  [facility]  Address: [address] [place] [address]  Phone: [phone]  Hours of Operation: 4th Sunday 9:00AM-11:00AM   Elaine LuisYeyo  Address: [address] [place] [address]  Phone: [phone]  Hours of Operation: Saturdays 10:30AM-12:30PM  [facility]  Address: [address] [place]  [address]  Phone: [phone]  Hours of Operation: By appointment -- Tuesday-Thursday 8:30AM-4:30PM  [facility]  Address: [address] [facility], [place] [address]  Phone: [phone]  Hours of Operation: 1st & 3rd Wednesdays 6:00PM-8:00PM  [place] - [address]  [facility] Food [facility]  Address: [address] [facility] [place] [address]  Phone: [phone]  Hours of Operation: Wednesdays & Fridays 3:00PM-4:00PM  [place] - [address]  Mirna Telles’s Pantry  Address: [address] [facility], [place] [address]  Phone: [phone]  Hours of Operation: Every other Saturday 10:30AM-12:30PM  [place] - [address]  [facility]  Address: [address] [facility] [place] [address]  Phone: [phone]  Hours of Operation: Tuesdays & Wednesdays 10:00AM-2:00PM; Closed last week of the month  [place] - [address]  [facility]s [facility]  Address: [facility], [place] [address]  Phone: [phone]  Hours of Operation: 1st Saturday 9:00AM-12:00PM; 3rd Thursday 4:00PM-7:00PM    [place] - [address]  [facility] Food Bank  Address: [address] [facility] [place] [address]  Phone: [phone]  Hours of Operation: 3rd Monday & 3rd Wednesday 4:00PM-6:00PM; 3rd Saturday 10:00AM-12:00PM  [place] - [address]  [facility]  Address: [address] [facility], [place] [address]  Phone: [phone]  Hours of Operation: 1st & 3rd Mondays 9:00AM-11:30AM  [place]  [address]  [facility]  Address: [address] [place] [address]  Phone: [phone]  Hours of Operation: 2nd Saturday 9:00AM-11:00AM  [facility] [address]  [facility]  Address: [address] [place] [address]  Phone: [phone]  Hours of Operation: 1st, 2nd, & 3rd Thursdays 4:00PM-7:00PM; Last Saturday 9:30AM-12:00PM  [address]  Address: [address] [place] [address]  Phone: [phone]  Hours of Operation: Tuesdays 6:00PM-7:00PM; Saturdays  4:00PM-5:00PM; Sundays 12:30PM-1:30PM  [place] Food [facility]  Address: [address] [facility], [place] [address]  Phone: [phone]  Hours of Operation: By appointment -- Mondays 6:00PM      Soup Mallika  [facility]  Address: [address] [facility] [place] [address]  Phone: [phone]  Hours of Operation: Monday-Friday 1:30PM-2:30PM  [facility])  Address: [address] [facility] [place] [address]  Phone: [phone]  Hours of Operation: Monday-Friday 9:00AM-5:00PM  [facility]  Address: [address] [place] [address]  Phone: [phone]  Hours of Operation: Tuesday-Thursday 12:00PM-1:00PM    [facility]  Address: [address] [address]  Phone: [phone]  Hours of Operation: Sundays 8:00AM-9:00AM; Some holidays  [facility] Food Bird  Municipalities:  [facility], [place], [place], [place], [place], and [place]  Emergency Food Pantries  [place] - [address]  [facility] Food [facility]  Address: [address] [place] [address]  Phone: [phone]  Hours of Operation: 2nd Tuesday 10:00AM-12:00PM  [place] - [address]  Marnieo Muslim [facility]  Address: [address] [facility] [place], [place] [address]  Phone: [phone]  Hours of Operation: Wednesdays 10:00AM-12:00PM  Ashely Garner [facility]  Address: [address] [facility] [place] [address]  Phone: [phone]  Hours of Operation: 1st & 3rd Tuesdays 5:00PM-6:00PM  [facility]  Address: [address] [facility], [place], [address]  Phone: [phone]  Hours of Operation: Tuesdays 6:00PM-7:00PM  Holy St[facility]ard Muslim  Address: [address], [place] [address]  Phone: [phone]  Hours of Operation: 1st & 2nd Saturdays 12:00PM- 4:00PM  [facility] Pantry  Address: [address], [place] [address]  Phone: [phone]  Hours of Operation: Monday-Friday 10:30AM-11:30AM  [facility]  Address: [address],  [place] [address]  Phone: [phone]  Hours of Operation: 3rd & 4th Saturdays 9:00AM-11:00AM; [facility] residents only    [place] - [address]  [place] [facility]  Address: [address] [facility], [place] [address]  Phone: [phone]  Hours of Operation: 2nd Thursday 10:00AM-12:00PM  [facility]  Address: [address] [facility], [place] [address]  Phone: [phone]  Hours of Operation: Monday & Tuesday of the first full week of the month 9:00AM-11:00AM  [facility]  Address: [address] [facility], [place] [address]  Phone: [phone]  Hours of Operation: Mondays, Wednesdays, & Thursdays 9:30AM-11:30AM  [facility]  Address: [address] [facility], [place] [address]  Phone: [phone]  Hours of Operation: 2nd & 4th Saturdays 10:00AM-12:00PM  [place] - [address]  [place] [facility]  Address: [address] [facility], [place] [address]  Phone: [phone]  Hours of Operation: By appointment -- Tuesdays & Wednesdays 10:00AM-4:00PM, Thursdays 10:00AM-  12:00PM, & Sundays 4:00PM-7:00PM  [facility] [place] [employer] [facility]  Address: [address] [facility], [place] [address]  Phone: [phone]  Hours of Operation: 3rd Saturday 10:00AM-12:00PM  HALLIE Friend  Address: [address] [facility], [place] [address]  Phone: [phone]  Hours of Operation: 3rd Saturday 9:00AM-11:30AM or by appointment  [facility]. [facility]  Address: [address] [facility], [place] [address]  Phone: [phone]  Hours of Operation: 2nd & 4thTuesdays 10:00AM-12:00PM  [facility]  Address: [address] [place] [place] [address]  Phone: [phone]  Hours of Operation: 1st, 2nd, & 4th Wednesdays 11:00AM-1:00PM; 3rd Wednesday 5:00PM-7:00PM  [facility] Pantry  Address: [address] [place] [address], [place] [address]  Phone: [phone]  Hours of Operation: Wednesdays 10:00AM-12:00PM; Last Wednesday 6:00PM-8:00PM  [address]  Address: [address]  [place] [address]  Phone: [phone]  Hours of Operation: Fridays 8:30AM-11:30AM & 1:30PM-3:30PM    [place] [facility]  Address: [address] [place] [address]  Phone: [phone]  Hours of Operation: By appointment--Mondays-Fridays 9:00AM -4:30PM.  [facility]  Address: [address] [place] [address]  Phone: [phone]  Hours of Operation: 1st & 3rd Tuesdays 6:00PM-7:30PM  [facility]  Address: [address] [place] [address]  Phone: [phone]  Hours of Operation: By appointment -- Mondays-Fridays 9:00AM-5:00PM  [facility]  Address: [address] [place] [address]  Phone: [phone]  Hours of Operation: 3rd, 4th, & 5th Thursdays 5:00PM-7:00PM  Project of [facility]  Address: [address] [place] [address]  Phone: [phone]  Hours of Operation: Mondays 10:00AM-12:30PM; Thursdays 10:00AM-12:30PM & 1:00PM-3:30 PM  [employer], [facility]  Address: [address] [facility], [address] [address]  Phone: [phone]  Hours of Operation: Tuesdays 5:00PM-6:00PM  [facility]  Address: [address] [place] [address]  Phone: [phone]  Hours of Operation: Thursdays 6:00PM-7:30PM; Closed 5th Thursday  [place] [id number]  [facility]  Address: [address] [place] [address]  Phone: [phone]  Hours of Operation: For last names beginning with A-M: 2nd Tuesday 8:00AM-10:00AM or 6:00PM-7:00PM;  For last names beginning with N-Z: 2nd Wednesday 8:00AM-10:00AM  [facility] [address]  [facility]  Address: [address] [facility], [place] [address]  Phone: [phone]  Hours of Operation: Wednesdays 9:30AM-12:00PM; 1st, 2nd, & 3rd Thursdays 6:00PM-8:00PM; 2nd & 3rd Saturdays 9:30AM-12:00PM  [place] - [address]  [facility]  Address: [address] [place] [address], [place] [address]  Phone: [phone]  Hours of Operation: 3rd Saturday  9:00AM-11:00AM    [facility] [place]  Address: [address] [facility], [place] [address]  Phone: [phone]  Hours of Operation: Tuesdays 10:00AM-2:00PM  [place] [facility]  Address: [address] [facility], [place] [address]  Phone: [phone]  Hours of Operation: Tuesdays 10:00AM-12:00PM; Closed 5th Tuesday  [place] - [address]  [employer]  Address: [address] [place] [address]  Phone: [phone]  Hours of Operation: Mondays 11:00AM-12:30PM & 7:00PM-8:30PM  [place]  [address]  [facility] Food Pantry  Address: [address] [place] [address]  Phone: [phone]  Hours of Operation: Mondays 5:00PM-7:00PM  [address]Central [employer]age  Address: [address] [facility] [place], [place] [address]  Phone: [phone]  Hours of Operation: 2nd & 4th Saturdays 9:00AM-10:00AM  Soup Mallika  [place]  [facility] [place]  Address: [address] [facility] [place] [address]  Phone: [phone]  Hours of Operation: 2nd Saturday - Lunch (Call for times)  [place]  [facility]  Address: [address] [facility], [place] [address]  Phone: [phone]  Ours of Operation: Sundays 1:00PM-2:00PM  [facility]  Address: [address] [facility] [place] [address]  Phone: [phone]  Hours of Operation: Saturdays 12:00PM-1:00PM  [facility]  Address: [address] [facility] [place] [address]  Phone: [phone]  Hours of Operation: Monday-Friday 8:00AM-4:00PM  Heather Buddhist [employer]  Address: [address] [facility] [place] [address]  Phone: [phone]  Hours of Operation: Monday-Friday 12:00PM-1:00PM    [facility]  Address: [address] [facility] [place] [address]  Phone: [phone]  Hours of Operation: Call for times  [facility]  Address: [address] [place], [place] [address] [address]  Phone: [phone]  Hours of Operation: Monday-Friday 12:00PM-1:00PM      [facility] [address]   [place] [address]  [facility]  Hours of  Operation:Every day of the week. Opens: 7:00pm Closes: 7:00am   Intake Procedure:Walk-in, subject to bed capacity.  Description of Service:   An emergency, cold-weather shelter for adult men and women, available on a walk-in basis before posted curfew hours. Snacks served, hot beverages, hygiene kits, clothing closet, case management available.  Opens: 7:00pm  Curfew: 10:00pm (no in/out past this time)  Lights Out: 11:00pm   [place] Call: 6:00am   Clean-up: 6:00am to 7:00am  Closes: 7:00am  Eligibility:Homeless men and women who have no other shelter options; unable to serve families  Service Contact:[phone], [email or web address]

## 2025-05-16 NOTE — ED PROVIDER NOTES
Time reflects when diagnosis was documented in both MDM as applicable and the Disposition within this note       Time User Action Codes Description Comment    5/16/2025  4:39 PM Paris Palacios Add [T40.601A] Overdose opiate, accidental or unintentional, initial encounter ([facility])     5/16/2025  4:39 PM Paris Palacios Add [F19.90] Recreational drug use     5/16/2025  4:40 PM PalaciosCarlos dumontia Add [S00.93XA] Contusion of head     5/16/2025  4:40 PM Palacios, Paris Add [T14.8XXA] Abrasion     5/16/2025  4:40 PM PalaciosCarlos dumontia Add [R79.89] Elevated troponin     5/16/2025  4:40 PM Paris Palacios Add [Z53.29] Left against medical advice           ED Disposition       ED Disposition   AMA    Condition   --    Date/Time   Fri May 16, 2025  4:41 PM    Comment   Date: 5/16/2025  Patient: Deonte Bartlett  Admitted: 5/16/2025  1:03 PM  Attending Provider: Karri Zhou MD    Deonte Tri or his authorized caregiver has made the decision for the patient to leave the emergency department agains t the advice of the emergency department staff. He or his authorized caregiver has been informed and understands the inherent risks, including death, pain and suffering, morbidity, coma, respiratory distress, respiratory failure, heart attack (STEMI/ NSTEMI).  He or his authorized caregiver has decided to accept the responsibility for this decision. Deonte Bartlett and all necessary parties have been advised that he may return for further evaluation or treatment. His condition at time of  discharge was stable.  Deonte Bartlett had current vital signs as follows:  /89 (BP Location: Left arm)   Pulse 88   Temp 98.6 °F (37 °C)   Resp 18   Wt 78.8 kg (173 lb 11.6 oz)                Assessment & Plan       Medical Decision Making  Decreased responsiveness and respirations prior to hospital.  Received 7 mg of Narcan.  Arrived awake alert and oriented x 4 on arrival complaining of headache, shortness of breath, pain.  Denies SI. Ddx includes but is not limited to accidental opioid overdose, ICH, ACS, dissection, infection/sepsis.  CT head without acute intracranial findings. Mild hypokalemia, kdur given. Tdap utd. In c-collar, CT cspine without acute findings, removed.  Chest x-ray without evidence of flash pulmonary edema on wet read, no acute respiratory distress, concern for mediastinum widening, ct dissection study ordered, negative.  ECG without acute ischemic changes or dysrhythmia on wet read.  Initial troponin 3, delta of 6, will admit for cardiac monitoring, repeat troponins.  Patient refused.  Requested to leave AMA.  Clinically sober at time of leaving AMA     Deonte Tri insists on leaving against medical advice, despite my recommendation to remain for ongoing treatment.   1: Capacity: I have determined that the patient has capacity to make the decision to leave against medical advice based on the following:   A. Ability to express a choice: The patient is able to express his or her choice and communicate that choice.   B. Ability to understand relevant information: The patient is able to verbalize their diagnosis, understand information about the purpose of treatment, remember the information, and show that he or she can be part of the decision-making process.   C. Ability to appreciate the significance of the information and its consequences: The patient understands the consequences of treatment refusal and the risks and benefits of accepting or refusing treatment.   D. Ability to manipulate information: The patient is able to engage in reasoning as it applies to making treatment decisions   2: Psychiatric Consultation: There is not an indication to call psychiatry consultation to determine capacity   3. Alternative Treatment: I have discussed the recommended course of treatment and available alternatives.   4. Risks: I have discussed the specific risks of that patient refusing treatment   5. Follow-up  "Care: I have discussed the follow-up care and advised to see PCP immediately   6. ED Option: I have emphasized that the patient has the option to return to the ED      Amount and/or Complexity of Data Reviewed  Labs: ordered.  Radiology: ordered.    Risk  Prescription drug management.        ED Course as of 05/16/25 1730   Fri May 16, 2025   1308 Tdap UTD. Last administered  09/08/2021 per chart review    1309 Arrived AAOx3. Reports he bought weed and that is the last thing he remembers. Per EMS was \"down\" on their arrival, was being bagged by their manager. He received 7 mg total of narcan, some IM, some through IV with reversal of decreased responsiveness and respiratory depression   1349 Procedure Note: EKG  Date/Time: 05/16/25 1:50 PM   Performed by: Paris Palacios   Authorized by: Paris Palacios  ECG interpreted by me, the ED Provider: yes   The EKG demonstrates:  Rate 64 bpm  Rhythm NSR   QTc 392 ms   No ST elevations/depressions     1638 Patient requesting to sign out AGAINST MEDICAL ADVICE despite admission being encouraged for rising troponin.  Discussed the possibility of a heart attack, he voiced understanding would still like to leave AGAINST MEDICAL ADVICE.  He is awake alert and oriented x 4 answering questions appropriately. GCS 15. Ambulating with steady gait.        Medications   naloxone (NARCAN) injection 0.4 mg (has no administration in time range)   ondansetron (ZOFRAN) injection 4 mg (has no administration in time range)   naloxone (NARCAN) 2 MG/2ML injection **ADS Override Pull** (0 mg  Given to EMS 5/16/25 1308)   sodium chloride 0.9 % bolus 1,000 mL (has no administration in time range)       ED Risk Strat Scores                    No data recorded                            History of Present Illness       Chief Complaint   Patient presents with    Overdose - Accidental     Patient found unresponsive, given 7 mg narcan; A&O x 4 during triage       Past Medical History:   Diagnosis Date    " Asthma     Drug use       History reviewed. No pertinent surgical history.   History reviewed. No pertinent family history.   Social History[1]   E-Cigarette/Vaping    E-Cigarette Use Never User       E-Cigarette/Vaping Substances      I have reviewed and agree with the history as documented.     29-year-old male with past medical history of substance use, asthma presenting to emergency department via EMS for suspected overdose.  Was found unresponsive, BVM was used on him, received IM and IV Narcan total of 7 mg prehospital.  Awake alert and oriented after receiving.  Admitted to buying/smoking marijuana notes last thing he remembered.  Denies SI HI.  Contusion to forehead noted, patient planing of head pain.  Also reports shortness of breath and pain.      History provided by:  Patient and EMS personnel  Overdose - Accidental  Associated symptoms: headaches, shortness of breath and unresponsiveness (resolved)    Associated symptoms: no abdominal pain, no agitation, no chest pain, no cough, no diaphoresis, no slurred speech and no vomiting        Review of Systems   Constitutional:  Negative for diaphoresis and fever.   Eyes:  Negative for visual disturbance.   Respiratory:  Positive for shortness of breath. Negative for cough.    Cardiovascular:  Negative for chest pain.   Gastrointestinal:  Negative for abdominal pain and vomiting.   Musculoskeletal:  Negative for neck pain.   Neurological:  Positive for light-headedness and headaches. Negative for seizures, weakness and numbness.   Psychiatric/Behavioral:  Negative for agitation, confusion and suicidal ideas.    All other systems reviewed and are negative.          Objective       ED Triage Vitals   Temperature Pulse Blood Pressure Respirations SpO2 Patient Position - Orthostatic VS   05/16/25 1304 05/16/25 1304 05/16/25 1304 05/16/25 1304 05/16/25 1304 05/16/25 1617   98.6 °F (37 °C) 80 139/83 18 97 % Lying      Temp src Heart Rate Source BP Location FiO2 (%)  Pain Score    -- 05/16/25 1359 05/16/25 1617 -- --     Monitor Left arm        Vitals      Date and Time Temp Pulse SpO2 Resp BP Pain Score FACES Pain Rating User   05/16/25 1617 -- 88 98 % 18 147/89 -- -- TEMO   05/16/25 1359 -- 72 92 % 16 -- -- -- MH   05/16/25 1304 98.6 °F (37 °C) 80 97 % 18 139/83 -- -- AM            Physical Exam  Vitals and nursing note reviewed.   Constitutional:       General: He is awake. He is not in acute distress.     Appearance: Normal appearance. He is not toxic-appearing.   HENT:      Head: Normocephalic. Abrasion and contusion present. No raccoon eyes or Stone's sign.        Mouth/Throat:      Lips: Pink.      Mouth: Mucous membranes are moist.     Eyes:      General: Vision grossly intact.      Extraocular Movements: Extraocular movements intact.      Pupils: Pupils are equal, round, and reactive to light.       Cardiovascular:      Rate and Rhythm: Normal rate and regular rhythm.      Heart sounds: Normal heart sounds.   Pulmonary:      Effort: Pulmonary effort is normal. No respiratory distress.      Breath sounds: Normal breath sounds.   Abdominal:      General: There is no distension.      Palpations: Abdomen is soft.      Tenderness: There is no abdominal tenderness.     Musculoskeletal:      Cervical back: No tenderness.     Skin:     General: Skin is warm and dry.     Neurological:      Mental Status: He is alert and oriented to person, place, and time.      GCS: GCS eye subscore is 4. GCS verbal subscore is 5. GCS motor subscore is 6.      Cranial Nerves: No facial asymmetry.      Sensory: No sensory deficit.      Motor: No weakness.      Gait: Gait normal.         Results Reviewed       Procedure Component Value Units Date/Time    HS Troponin I 2hr [[id number]]  (Normal) Collected: 05/16/25 1510    Lab Status: Final result Specimen: Blood from Arm, Left Updated: 05/16/25 1539     hs TnI 2hr 9 ng/L      Delta 2hr hsTnI 6 ng/L     HS Troponin I 4hr [[id number]]     Lab Status:  No result Specimen: Blood     Rapid drug screen, urine [[id number]]  (Abnormal) Collected: 05/16/25 1441    Lab Status: Final result Specimen: Urine, Other Updated: 05/16/25 1507     Amph/Meth UR Negative     Barbiturate Ur Negative     Benzodiazepine Urine Negative     Cocaine Urine Negative     Methadone Urine Negative     Opiate Urine Positive     PCP Ur Negative     THC Urine Positive     Oxycodone Urine Negative     Fentanyl Urine Positive     HYDROCODONE URINE Negative    Narrative:      Presumptive report. If requested, specimen will be sent to reference lab for confirmation.  FOR MEDICAL PURPOSES ONLY.   IF CONFIRMATION NEEDED PLEASE CONTACT THE LAB WITHIN 5 DAYS.    Drug Screen Cutoff Levels:  AMPHETAMINE/METHAMPHETAMINES  1000 ng/mL  BARBITURATES     200 ng/mL  BENZODIAZEPINES     200 ng/mL  COCAINE      300 ng/mL  METHADONE      300 ng/mL  OPIATES      300 ng/mL  PHENCYCLIDINE     25 ng/mL  THC       50 ng/mL  OXYCODONE      100 ng/mL  FENTANYL      5 ng/mL  HYDROCODONE     300 ng/mL    HS Troponin 0hr (reflex protocol) [[id number]]  (Normal) Collected: 05/16/25 1316    Lab Status: Final result Specimen: Blood from Arm, Right Updated: 05/16/25 1349     hs TnI 0hr 3 ng/L     Comprehensive metabolic panel [[id number]]  (Abnormal) Collected: 05/16/25 1316    Lab Status: Final result Specimen: Blood from Arm, Right Updated: 05/16/25 1349     Sodium 139 mmol/L      Potassium 3.3 mmol/L      Chloride 102 mmol/L      CO2 28 mmol/L      ANION GAP 9 mmol/L      BUN 24 mg/dL      Creatinine 1.11 mg/dL      Glucose 164 mg/dL      Calcium 9.1 mg/dL      AST 15 U/L      ALT 10 U/L      Alkaline Phosphatase 71 U/L      Total Protein 7.0 g/dL      Albumin 4.6 g/dL      Total Bilirubin 0.97 mg/dL      eGFR 88 ml/min/1.73sq m     Narrative:      National Kid[employer] guidelines for Chronic Kidney Disease (CKD):     Stage 1 with normal or high GFR (GFR > 90 mL/min/1.73 square meters)    Stage 2 Mild CKD (GFR =  60-89 mL/min/1.73 square meters)    Stage 3A Moderate CKD (GFR = 45-59 mL/min/1.73 square meters)    Stage 3B Moderate CKD (GFR = 30-44 mL/min/1.73 square meters)    Stage 4 Severe CKD (GFR = 15-29 mL/min/1.73 square meters)    Stage 5 End Stage CKD (GFR <15 mL/min/1.73 square meters)  Note: GFR calculation is accurate only with a steady state creatinine    Salicylate level [[id number]]  (Abnormal) Collected: 05/16/25 1316    Lab Status: Final result Specimen: Blood from Arm, Right Updated: 05/16/25 1349     Salicylate Lvl <5 mg/dL     Acetaminophen level-If concentration is detectable, please discuss with medical  on call. [[id number]]  (Abnormal) Collected: 05/16/25 1316    Lab Status: Final result Specimen: Blood from Arm, Right Updated: 05/16/25 1349     Acetaminophen Level <2 ug/mL     Ethanol [[id number]]  (Normal) Collected: 05/16/25 1316    Lab Status: Final result Specimen: Blood from Arm, Right Updated: 05/16/25 1347     Ethanol Lvl <10 mg/dL     CBC and differential [[id number]] Collected: 05/16/25 1316    Lab Status: Final result Specimen: Blood from Arm, Right Updated: 05/16/25 1325     WBC 9.06 Thousand/uL      RBC 5.20 Million/uL      Hemoglobin 15.1 g/dL      Hematocrit 47.9 %      MCV 92 fL      MCH 29.0 pg      MCHC 31.5 g/dL      RDW 12.9 %      MPV 10.6 fL      Platelets 179 Thousands/uL      nRBC 0 /100 WBCs      Segmented % 63 %      Immature Grans % 0 %      Lymphocytes % 30 %      Monocytes % 6 %      Eosinophils Relative 1 %      Basophils Relative 0 %      Absolute Neutrophils 5.60 Thousands/µL      Absolute Immature Grans 0.04 Thousand/uL      Absolute Lymphocytes 2.73 Thousands/µL      Absolute Monocytes 0.58 Thousand/µL      Eosinophils Absolute 0.07 Thousand/µL      Basophils Absolute 0.04 Thousands/µL             CTA dissection protocol chest abdomen pelvis w wo contrast   Final Interpretation by Raf Castaneda MD (05/16 1544)      No acute aortic findings.       Mild nonspecific diffuse bronchitis.      No acute findings in the abdomen or pelvis         The study was marked in EPIC for immediate notification.      Workstation performed: [id number]         XR chest 1 view portable   Final Interpretation by Negar Jacques MD (05/16 1412)      No acute cardiopulmonary disease.            Workstation performed: [id number]         CT head without contrast   Final Interpretation by Corbin Mcgowan MD (05/16 2432)      No acute intracranial abnormality.      Small asymmetric right frontal scalp soft tissue swelling, likely soft tissue contusion.      The study was marked in EPIC for immediate notification.                  Workstation performed: [id number]         CT cervical spine without contrast   Final Interpretation by Fito Anders MD (05/16 4557)      No cervical spine fracture or traumatic malalignment.                  Workstation performed: [id number]             Procedures    ED Medication and Procedure Management   Prior to Admission Medications   Prescriptions Last Dose Informant Patient Reported? Taking?   Narcan 4 MG/0.1ML nasal spray   Yes No   QUEtiapine (SEROquel) 100 mg tablet   Yes No   Sig: Take 100 mg by mouth   acetaminophen (TYLENOL) 325 mg tablet   Yes No   albuterol (PROVENTIL HFA,VENTOLIN HFA) 90 mcg/act inhaler   Yes No   Sig: Inhale 1 puff 4 (four) times a day   buprenorphine (SUBUTEX) 8 mg   Yes No   ibuprofen (MOTRIN) 800 mg tablet   Yes No   mirtazapine (REMERON) 15 mg tablet   Yes No      Facility-Administered Medications: None     Discharge Medication List as of 5/16/2025  4:48 PM        CONTINUE these medications which have NOT CHANGED    Details   acetaminophen (TYLENOL) 325 mg tablet Historical Med      albuterol (PROVENTIL HFA,VENTOLIN HFA) 90 mcg/act inhaler Inhale 1 puff 4 (four) times a day, Starting Wed 3/13/2024, Historical Med      buprenorphine (SUBUTEX) 8 mg Historical Med      ibuprofen (MOTRIN) 800 mg tablet  Historical Med      mirtazapine (REMERON) 15 mg tablet Historical Med      Narcan 4 MG/0.1ML nasal spray Historical Med      QUEtiapine (SEROquel) 100 mg tablet Take 100 mg by mouth, Historical Med           No discharge procedures on file.  ED SEPSIS DOCUMENTATION   Time reflects when diagnosis was documented in both MDM as applicable and the Disposition within this note       Time User Action Codes Description Comment    5/16/2025  4:39 PM Paris Palacios [T40.601A] Overdose opiate, accidental or unintentional, initial encounter ([facility])     5/16/2025  4:39 PM Paris Palacios [F19.90] Recreational drug use     5/16/2025  4:40 PM Paris Palacios [S00.93XA] Contusion of head     5/16/2025  4:40 PM Paris Palacios [T14.8XXA] Abrasion     5/16/2025  4:40 PM Paris Palacios [R79.89] Elevated troponin     5/16/2025  4:40 PM Paris Palacios [Z53.29] Left against medical advice                    [1]   Social History  Tobacco Use    Smoking status: Every Day     Current packs/day: 1.00     Types: Cigarettes    Smokeless tobacco: Never   Vaping Use    Vaping status: Never Used   Substance Use Topics    Alcohol use: Never    Drug use: Yes     Types: Marijuana, Heroin, Methamphetamines     Comment: JULIO CÉSAR Palacios PA-C  05/16/25 3724

## 2025-05-20 ENCOUNTER — HOSPITAL ENCOUNTER (EMERGENCY)
Facility: HOSPITAL | Age: 30
Discharge: HOME | End: 2025-05-20
Attending: EMERGENCY MEDICINE
Payer: COMMERCIAL

## 2025-05-20 VITALS
RESPIRATION RATE: 18 BRPM | WEIGHT: 162 LBS | TEMPERATURE: 97.7 F | HEART RATE: 84 BPM | DIASTOLIC BLOOD PRESSURE: 62 MMHG | SYSTOLIC BLOOD PRESSURE: 114 MMHG | OXYGEN SATURATION: 96 % | BODY MASS INDEX: 23.92 KG/M2

## 2025-05-20 DIAGNOSIS — F19.10 SUBSTANCE ABUSE (HCC): Primary | ICD-10-CM

## 2025-05-20 PROCEDURE — 99284 EMERGENCY DEPT VISIT MOD MDM: CPT

## 2025-05-20 RX ORDER — NALOXONE HYDROCHLORIDE 1 MG/ML
2 INJECTION INTRAMUSCULAR; INTRAVENOUS; SUBCUTANEOUS ONCE
Status: COMPLETED | OUTPATIENT
Start: 2025-05-20 | End: 2025-05-20

## 2025-05-20 RX ADMIN — NALOXONE HYDROCHLORIDE 2 MG: 1 INJECTION INTRAMUSCULAR; INTRAVENOUS; SUBCUTANEOUS at 08:52

## 2025-05-20 NOTE — ED PROVIDER NOTES
"Time reflects when diagnosis was documented in both MDM as applicable and the Disposition within this note       Time User Action Codes Description Comment    5/20/2025 10:45 AM Jany Luna [F19.10] Substance abuse (HCC)           ED Disposition       ED Disposition   Discharge    Condition   Stable    Date/Time   Tue May 20, 2025 10:45 AM    Comment   Deonte Bartlett discharge to home/self care.                   Assessment & Plan       Medical Decision Making  29 year old male presenting for AMS after smoking K2. No acute symptoms. Some hypoxia initially, given 2 mg IN Narcan and 2L NC O2.   After several hours, pt feeling better and is awake, alert, oriented. Maintaining oxygen saturations on RA.  =================  Pt stable at time of discharge, vital signs reviewed, questions answered. Strict ER return precautions provided/discussed and were well understood by patient. Patient's vitals, labs and/or imaging results, diagnosis, and treatment plan were discussed with the patient. All new and/or changed medications were discussed - specifically to include route of administration, how often to take, when to take, and the pharmacy they were sent to. Strict return precautions as well as close follow up with PCP was discussed with the patient and the patient was agreeable to my recommendations.  Patient verbally acknowledged understanding. All labs, imaging were reviewed and used in the medical decision making process (if ordered).     Portions of this chart may have been written with voice recognition software.  Occasional grammatical errors, wrong word or \"sound a like\" substitutions may have occurred due to software limitations.  Please read carefully and use context to recognize where substitutions have occurred.    Problems Addressed:  Substance abuse (HCC): acute illness or injury    Amount and/or Complexity of Data Reviewed  External Data Reviewed: labs, radiology, ECG and " notes.    Risk  Prescription drug management.        ED Course as of 05/20/25 1047   Tue May 20, 2025   0847 O2 saturations dropping into the high 80s - 2 mg IN Narcan ordered. Observing. Placing on 2L NC        Medications   naloxone (NARCAN) intranasal 2 mg (2 mg Nasal Given 5/20/25 0872)       ED Risk Strat Scores                    No data recorded                            History of Present Illness       Chief Complaint   Patient presents with    Altered Mental Status     Patient presents to ED with AEMS.  EMS was called by passers by due to the patient stumbling and leaning over the side of a bridge on [facility].  Patient wakes to voice for EMS. Patient answering some questions.  For this RN patient reports he smoked K2.  Patient reports he does so regularly.  Patient denies other drug use.  Patient denies other complaints.  Patient denies SI/HI.       Past Medical History:   Diagnosis Date    Asthma     Drug use       History reviewed. No pertinent surgical history.   History reviewed. No pertinent family history.   Social History[1]   E-Cigarette/Vaping    E-Cigarette Use Never User       E-Cigarette/Vaping Substances      I have reviewed and agree with the history as documented.     Deonte is a 29 year old male presenting to the ED via EMS due to passers calling 911 as the pt stumbled across a bridge. Responding to questions, denies acute symptoms, admits to smoking K2 recently and does so regularly. Was seen four days ago for similar with full and overall negative workup. EMS did not administer Narcan.        Review of Systems   Constitutional:  Positive for fatigue. Negative for chills, diaphoresis and fever.   Respiratory:  Negative for cough and shortness of breath.    Cardiovascular:  Negative for chest pain and palpitations.   Gastrointestinal:  Negative for abdominal pain, diarrhea and vomiting.   Skin:  Negative for rash.   Neurological:  Negative for light-headedness and headaches.            Objective       ED Triage Vitals [05/20/25 0829]   Temperature Pulse Blood Pressure Respirations SpO2 Patient Position - Orthostatic VS   97.7 °F (36.5 °C) 94 104/65 18 91 % Sitting      Temp Source Heart Rate Source BP Location FiO2 (%) Pain Score    Tympanic Monitor Left arm -- --      Vitals      Date and Time Temp Pulse SpO2 Resp BP Pain Score FACES Pain Rating User   05/20/25 1015 -- 78 96 % 18 -- -- --    05/20/25 0930 -- 82 94 % 16 -- -- -- AP   05/20/25 0903 -- -- 94 % -- -- -- --    05/20/25 0851 -- 84 95 % 14 -- -- --    05/20/25 0829 97.7 °F (36.5 °C) 94 91 % 18 104/65 -- --             Physical Exam  Vitals reviewed.   Constitutional:       General: He is sleeping.   HENT:      Head: Normocephalic and atraumatic.      Right Ear: Tympanic membrane, ear canal and external ear normal.      Left Ear: Tympanic membrane, ear canal and external ear normal.      Nose: Nose normal.     Eyes:      General: No scleral icterus.        Right eye: No discharge.         Left eye: No discharge.      Extraocular Movements: Extraocular movements intact.      Conjunctiva/sclera: Conjunctivae normal.      Pupils: Pupils are equal, round, and reactive to light.      Comments: Pinpoint pupils     Cardiovascular:      Rate and Rhythm: Normal rate.      Pulses: Normal pulses.           Radial pulses are 2+ on the right side and 2+ on the left side.        Dorsalis pedis pulses are 2+ on the right side and 2+ on the left side.        Posterior tibial pulses are 2+ on the right side and 2+ on the left side.   Pulmonary:      Effort: Pulmonary effort is normal. No respiratory distress.      Breath sounds: Normal breath sounds.   Abdominal:      Palpations: Abdomen is soft.      Tenderness: There is no abdominal tenderness. There is no guarding.     Musculoskeletal:      Cervical back: Normal range of motion and neck supple. No rigidity or tenderness.   Lymphadenopathy:      Cervical: No cervical adenopathy.      Skin:     Capillary Refill: Capillary refill takes less than 2 seconds.     Neurological:      General: No focal deficit present.      Mental Status: He is oriented to person, place, and time and easily aroused.         Results Reviewed       None            No orders to display       Procedures    ED Medication and Procedure Management   Prior to Admission Medications   Prescriptions Last Dose Informant Patient Reported? Taking?   Narcan 4 MG/0.1ML nasal spray   Yes No   QUEtiapine (SEROquel) 100 mg tablet   Yes No   Sig: Take 100 mg by mouth   acetaminophen (TYLENOL) 325 mg tablet   Yes No   albuterol (PROVENTIL HFA,VENTOLIN HFA) 90 mcg/act inhaler   Yes No   Sig: Inhale 1 puff 4 (four) times a day   buprenorphine (SUBUTEX) 8 mg   Yes No   ibuprofen (MOTRIN) 800 mg tablet   Yes No   mirtazapine (REMERON) 15 mg tablet   Yes No      Facility-Administered Medications: None     Current Discharge Medication List        CONTINUE these medications which have NOT CHANGED    Details   acetaminophen (TYLENOL) 325 mg tablet       albuterol (PROVENTIL HFA,VENTOLIN HFA) 90 mcg/act inhaler Inhale 1 puff 4 (four) times a day      buprenorphine (SUBUTEX) 8 mg       ibuprofen (MOTRIN) 800 mg tablet       mirtazapine (REMERON) 15 mg tablet       Narcan 4 MG/0.1ML nasal spray       QUEtiapine (SEROquel) 100 mg tablet Take 100 mg by mouth           No discharge procedures on file.  ED SEPSIS DOCUMENTATION   Time reflects when diagnosis was documented in both MDM as applicable and the Disposition within this note       Time User Action Codes Description Comment    5/20/2025 10:45 AM Jany Luna [F19.10] Substance abuse (HCC)                    Jany Luna PA-C  05/20/25 1047         [1]   Social History  Tobacco Use    Smoking status: Every Day     Current packs/day: 1.00     Types: Cigarettes    Smokeless tobacco: Never   Vaping Use    Vaping status: Never Used   Substance Use Topics    Alcohol use: Never     Drug use: Yes     Types: Marijuana, Heroin, Methamphetamines     Comment: JULIO CÉSAR Luna PA-C  05/20/25 1044

## 2025-05-21 ENCOUNTER — HOSPITAL ENCOUNTER (EMERGENCY)
Facility: HOSPITAL | Age: 30
Discharge: HOME | End: 2025-05-21
Attending: EMERGENCY MEDICINE | Admitting: EMERGENCY MEDICINE
Payer: COMMERCIAL

## 2025-05-21 VITALS
SYSTOLIC BLOOD PRESSURE: 109 MMHG | RESPIRATION RATE: 18 BRPM | OXYGEN SATURATION: 95 % | HEART RATE: 72 BPM | WEIGHT: 153 LBS | BODY MASS INDEX: 22.59 KG/M2 | TEMPERATURE: 98.5 F | DIASTOLIC BLOOD PRESSURE: 69 MMHG

## 2025-05-21 DIAGNOSIS — F19.10 SUBSTANCE ABUSE (HCC): Primary | ICD-10-CM

## 2025-05-21 PROCEDURE — 99282 EMERGENCY DEPT VISIT SF MDM: CPT

## 2025-05-21 PROCEDURE — 99283 EMERGENCY DEPT VISIT LOW MDM: CPT | Performed by: EMERGENCY MEDICINE

## 2025-05-21 NOTE — ED PROVIDER NOTES
Time reflects when diagnosis was documented in both MDM as applicable and the Disposition within this note       Time User Action Codes Description Comment    5/21/2025  3:23 PM Amadeo Rojas Add [F19.10] Substance abuse (HCC)           ED Disposition       ED Disposition   Left from Room after Provider Exam    Condition   --    Date/Time   Wed May 21, 2025  3:23 PM    Comment   --             Assessment & Plan       Medical Decision Making  29-year-old male, presents by EMS after being found sleeping outside.  Differential diagnosis includes substance abuse, psychosis among other diagnoses.  Patient awake and alert on arrival to ED, does not want to stay and does not want any further evaluation, ambulated out of ED without difficulty.  Patient left prior to discharge.  Previous medical records reviewed, patient with history of substance abuse.    Amount and/or Complexity of Data Reviewed  External Data Reviewed: notes.             Medications - No data to display    ED Risk Strat Scores                    No data recorded                            History of Present Illness       Chief Complaint   Patient presents with    Recreational Drug Use     k2       Past Medical History[1]   Past Surgical History[2]   Family History[3]   Social History[4]   E-Cigarette/Vaping    E-Cigarette Use Never User       E-Cigarette/Vaping Substances      I have reviewed and agree with the history as documented.     29-year-old male, history of substance abuse, presents by EMS after being found sleeping outside.  After arrival to ED, patient awake and alert, does not want any further evaluation.  Patient states he was just tired and sleeping, has no recurrent complaints and walked out of the ED.      History provided by:  Patient and EMS personnel      Review of Systems   Respiratory: Negative.     Gastrointestinal: Negative.            Objective       ED Triage Vitals [05/21/25 1520]   Temperature Pulse Blood Pressure Respirations  SpO2 Patient Position - Orthostatic VS   98.5 °F (36.9 °C) 72 109/69 18 95 % Sitting      Temp Source Heart Rate Source BP Location FiO2 (%) Pain Score    Oral Monitor Left arm -- --      Vitals      Date and Time Temp Pulse SpO2 Resp BP Pain Score FACES Pain Rating User   05/21/25 1520 98.5 °F (36.9 °C) 72 95 % 18 109/69 -- -- JW            Physical Exam  Vitals and nursing note reviewed.   Constitutional:       General: He is not in acute distress.  HENT:      Head: Normocephalic and atraumatic.     Cardiovascular:      Rate and Rhythm: Normal rate.   Pulmonary:      Effort: Pulmonary effort is normal.     Neurological:      General: No focal deficit present.      Mental Status: He is alert and oriented to person, place, and time.      Motor: No weakness.      Gait: Gait normal.         Results Reviewed       None            No orders to display       Procedures    ED Medication and Procedure Management   Prior to Admission Medications   Prescriptions Last Dose Informant Patient Reported? Taking?   Narcan 4 MG/0.1ML nasal spray   Yes No   QUEtiapine (SEROquel) 100 mg tablet   Yes No   Sig: Take 100 mg by mouth   acetaminophen (TYLENOL) 325 mg tablet   Yes No   albuterol (PROVENTIL HFA,VENTOLIN HFA) 90 mcg/act inhaler   Yes No   Sig: Inhale 1 puff 4 (four) times a day   buprenorphine (SUBUTEX) 8 mg   Yes No   ibuprofen (MOTRIN) 800 mg tablet   Yes No   mirtazapine (REMERON) 15 mg tablet   Yes No      Facility-Administered Medications: None     Patient's Medications   Discharge Prescriptions    No medications on file     No discharge procedures on file.  ED SEPSIS DOCUMENTATION   Time reflects when diagnosis was documented in both MDM as applicable and the Disposition within this note       Time User Action Codes Description Comment    5/21/2025  3:23 PM Amadeo Rojas Add [F19.10] Substance abuse (HCC)                    [1]   Past Medical History:  Diagnosis Date    Asthma     Drug use    [2] No past surgical  history on file.  [3] No family history on file.  [4]   Social History  Tobacco Use    Smoking status: Every Day     Current packs/day: 1.00     Types: Cigarettes    Smokeless tobacco: Never   Vaping Use    Vaping status: Never Used   Substance Use Topics    Alcohol use: Never    Drug use: Yes     Types: Marijuana, Heroin, Methamphetamines     Comment: K2        Amadeo Rojas MD  05/21/25 1529

## 2025-05-21 NOTE — ED NOTES
Pt states he doesn't want to be hear and is walking towards exit. Pt has steady gait. Provider aware and is following pt to exit     Gabrielle Garcia RN  05/21/25 4473